# Patient Record
Sex: MALE | Race: AMERICAN INDIAN OR ALASKA NATIVE
[De-identification: names, ages, dates, MRNs, and addresses within clinical notes are randomized per-mention and may not be internally consistent; named-entity substitution may affect disease eponyms.]

---

## 2018-08-15 ENCOUNTER — HOSPITAL ENCOUNTER (EMERGENCY)
Dept: HOSPITAL 31 - C.ER | Age: 28
Discharge: HOME | End: 2018-08-15
Payer: MEDICAID

## 2018-08-15 VITALS — HEART RATE: 52 BPM | DIASTOLIC BLOOD PRESSURE: 73 MMHG | SYSTOLIC BLOOD PRESSURE: 120 MMHG | TEMPERATURE: 98 F

## 2018-08-15 VITALS — RESPIRATION RATE: 20 BRPM

## 2018-08-15 VITALS — OXYGEN SATURATION: 99 %
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
## 2018-08-15 DIAGNOSIS — S90.31XA: Primary | ICD-10-CM
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
